# Patient Record
Sex: MALE | Race: OTHER
[De-identification: names, ages, dates, MRNs, and addresses within clinical notes are randomized per-mention and may not be internally consistent; named-entity substitution may affect disease eponyms.]

---

## 2020-06-15 ENCOUNTER — HOSPITAL ENCOUNTER (EMERGENCY)
Dept: HOSPITAL 72 - EMR | Age: 55
Discharge: HOME | End: 2020-06-15
Payer: MEDICAID

## 2020-06-15 VITALS — WEIGHT: 250 LBS | BODY MASS INDEX: 32.08 KG/M2 | HEIGHT: 74 IN

## 2020-06-15 VITALS — DIASTOLIC BLOOD PRESSURE: 92 MMHG | SYSTOLIC BLOOD PRESSURE: 140 MMHG

## 2020-06-15 DIAGNOSIS — R05: ICD-10-CM

## 2020-06-15 DIAGNOSIS — I10: ICD-10-CM

## 2020-06-15 DIAGNOSIS — K57.90: ICD-10-CM

## 2020-06-15 DIAGNOSIS — M25.552: Primary | ICD-10-CM

## 2020-06-15 DIAGNOSIS — M16.12: ICD-10-CM

## 2020-06-15 PROCEDURE — 99284 EMERGENCY DEPT VISIT MOD MDM: CPT

## 2020-06-15 PROCEDURE — 72192 CT PELVIS W/O DYE: CPT

## 2020-06-15 NOTE — DIAGNOSTIC IMAGING REPORT
EXAM:

  CT Pelvis Without Intravenous Contrast

 

CLINICAL HISTORY:

  TRAUMA

 

TECHNIQUE:

  Axial computed tomography images of the pelvis without intravenous 

contrast.  CTDI is 8 mGy and DLP is 271 mGy-cm.  One or more of the 

following dose reduction techniques were used: automated exposure control,

 adjustment of the mA and/or kV according to patient size, use of 

iterative reconstruction technique.

 

COMPARISON:

  No relevant prior studies available.

 

FINDINGS:

 

  Bones/joints:  No acute fracture.  No dislocation.  Mild to moderate 

bilateral hip osteoarthrosis.

  Soft tissues: No hematoma.  Colonic diverticulosis.  There is increased 

fluid within the left hip joint.

 

IMPRESSION:     

  No acute fracture.  Increased fluid in the left hip joint, correlate 

with synovitis/bursitis.  Consider outpatient MRI of the pelvis.

## 2020-06-15 NOTE — EMERGENCY ROOM REPORT
History of Present Illness


General


Chief Complaint:  Lower Extremity Injury


Source:  Patient





Present Illness


HPI


Patient presents with complaints of left inguinal left hip pain


He did appear somewhat inebriated with sluggish and slurring speech, and he 

does admit to drinking earlier today he does consider himself an alcoholic





Denies any fall however he reports that he was stretching and doing exercises 

when the pain came on


Denies any abdominal pain denies any testicular pain denies any rectal pain


Pain is worse with standing and walking


Allergies:  


Coded Allergies:  


     No Known Allergies (Unverified , 3/18/18)





COVID-19 Screening


Contact w/high risk pt:  No


Recent Travel to affected area:  No


Experienced COVID-19 symptoms?:  Yes


COVID-19 symptoms experienced:  Cough


COVID-19 Testing performed PTA:  No





Patient History


Past Medical History:  see triage record


Reviewed Nursing Documentation:  PMH: Agreed; PSxH: Agreed





Nursing Documentation-PMH


Hx Hypertension:  Yes





Review of Systems


All Other Systems:  negative except mentioned in HPI





Physical Exam





Vital Signs








  Date Time  Temp Pulse Resp B/P (MAP) Pulse Ox O2 Delivery O2 Flow Rate FiO2


 


6/15/20 18:58 97.9 110 22 146/100 (115) 96 Room Air  








Sp02 EP Interpretation:  reviewed, normal


General Appearance:  well appearing, no apparent distress


Head:  normocephalic, atraumatic


Eyes:  bilateral eye PERRL, bilateral eye EOMI


ENT:  hearing grossly normal, normal pharynx, TMs + canals normal, uvula midline


Neck:  full range of motion, supple, no meningismus, no bony tend


Respiratory:  lungs clear, normal breath sounds, no rhonchi, no respiratory 

distress, no retraction, no accessory muscle use


Cardiovascular #1:  normal peripheral pulses, regular rate, rhythm, no edema, 

no gallop, no JVD, no murmur


Gastrointestinal:  normal bowel sounds, non tender, soft, no mass, no 

organomegaly, non-distended, no guarding, no hernia, no pulsatile mass, no 

rebound


Genitourinary:  no CVA tenderness


Musculoskeletal:  other - Some reproducible discomfort at the left inguinal 

region patient is able to flex both hips


Neurologic:  oriented x3, sensory intact, responsive


Psychiatric:  mood/affect normal


Lymphatic:  normal inspection, no adenopathy





Medical Decision Making


Diagnostic Impression:  


 Primary Impression:  


 Groin strain


ER Course


Multiple differentials including but not limited to fracture, soft tissue injury

, septic joint considered





Patient is otherwise ambulatory and moving the hip well


CT imaging shows nonspecific nonacute, findings patient will benefit from 

outpatient imaging such as MRI


At this time consideration for septic joint is low


Given the patient's mobility and stable for close outpatient follow-up


CT/MRI/US Diagnostic Results


CT/MRI/US Diagnostic Results :  


   Impression


Pelvic CTIMPRESSION:     


  No acute fracture.  Increased fluid in the left hip joint, correlate 


with synovitis/bursitis.  Consider outpatient MRI of the pelvis.





Last Vital Signs








  Date Time  Temp Pulse Resp B/P (MAP) Pulse Ox O2 Delivery O2 Flow Rate FiO2


 


6/15/20 18:58 97.9 110 22 146/100 (115) 96 Room Air  








Status:  improved


Disposition:  HOME, SELF-CARE


Condition:  Improved


Scripts


Ibuprofen* (MOTRIN*) 600 Mg Tablet


600 MG ORAL Q6H PRN for FOR PAIN, #20 TAB 0 Refills


   Prov: Tee Campos DO         6/15/20





Additional Instructions:  


Patient is provided with the discharge instructions notified to follow up with 

primary doctor in the next 2-3 days otherwise return to the er with any 

worsening symptoms.


Please note that this report is being documented using DRAGON technology.  This 

can lead to erroneous entry secondary to incorrect interpretation by the 

dictating instrument.











Tee Campos DO Irvin 15, 2020 19:22

## 2020-06-15 NOTE — NUR
ED Nurse Note:



Pt brought into ED by BATSHEVA CHRISTENS[facility] for c/o bilat leg pain that radiates to L hip. 
Pt is breathing normal and unlabored. Pt is aaox4. NAD.

## 2020-07-13 ENCOUNTER — HOSPITAL ENCOUNTER (EMERGENCY)
Dept: HOSPITAL 72 - EMR | Age: 55
Discharge: HOME | End: 2020-07-13
Payer: MEDICAID

## 2020-07-13 VITALS — DIASTOLIC BLOOD PRESSURE: 104 MMHG | SYSTOLIC BLOOD PRESSURE: 156 MMHG

## 2020-07-13 VITALS — BODY MASS INDEX: 29.8 KG/M2 | WEIGHT: 220 LBS | HEIGHT: 72 IN

## 2020-07-13 VITALS — SYSTOLIC BLOOD PRESSURE: 156 MMHG | DIASTOLIC BLOOD PRESSURE: 104 MMHG

## 2020-07-13 DIAGNOSIS — F10.129: Primary | ICD-10-CM

## 2020-07-13 DIAGNOSIS — I10: ICD-10-CM

## 2020-07-13 LAB
ADD MANUAL DIFF: NO
ALBUMIN SERPL-MCNC: 4.2 G/DL (ref 3.4–5)
ALBUMIN/GLOB SERPL: 1 {RATIO} (ref 1–2.7)
ALP SERPL-CCNC: 92 U/L (ref 46–116)
ALT SERPL-CCNC: 61 U/L (ref 12–78)
ANION GAP SERPL CALC-SCNC: 19 MMOL/L (ref 5–15)
AST SERPL-CCNC: 165 U/L (ref 15–37)
BASOPHILS NFR BLD AUTO: 0.7 % (ref 0–2)
BILIRUB DIRECT SERPL-MCNC: 0.1 MG/DL (ref 0–0.3)
BILIRUB SERPL-MCNC: 1.1 MG/DL (ref 0.2–1)
BUN SERPL-MCNC: 11 MG/DL (ref 7–18)
CALCIUM SERPL-MCNC: 9 MG/DL (ref 8.5–10.1)
CHLORIDE SERPL-SCNC: 98 MMOL/L (ref 98–107)
CO2 SERPL-SCNC: 24 MMOL/L (ref 21–32)
CREAT SERPL-MCNC: 1.2 MG/DL (ref 0.55–1.3)
EOSINOPHIL NFR BLD AUTO: 1.4 % (ref 0–3)
ERYTHROCYTE [DISTWIDTH] IN BLOOD BY AUTOMATED COUNT: 14.8 % (ref 11.6–14.8)
GLOBULIN SER-MCNC: 4.1 G/DL
HCT VFR BLD CALC: 42.4 % (ref 42–52)
HGB BLD-MCNC: 14.6 G/DL (ref 14.2–18)
LYMPHOCYTES NFR BLD AUTO: 38 % (ref 20–45)
MCV RBC AUTO: 107 FL (ref 80–99)
MONOCYTES NFR BLD AUTO: 12.3 % (ref 1–10)
NEUTROPHILS NFR BLD AUTO: 47.7 % (ref 45–75)
PLATELET # BLD: 66 K/UL (ref 150–450)
POTASSIUM SERPL-SCNC: 3.5 MMOL/L (ref 3.5–5.1)
RBC # BLD AUTO: 3.98 M/UL (ref 4.7–6.1)
SODIUM SERPL-SCNC: 140 MMOL/L (ref 136–145)
WBC # BLD AUTO: 4.8 K/UL (ref 4.8–10.8)

## 2020-07-13 PROCEDURE — 96360 HYDRATION IV INFUSION INIT: CPT

## 2020-07-13 PROCEDURE — 85025 COMPLETE CBC W/AUTO DIFF WBC: CPT

## 2020-07-13 PROCEDURE — 71045 X-RAY EXAM CHEST 1 VIEW: CPT

## 2020-07-13 PROCEDURE — 84484 ASSAY OF TROPONIN QUANT: CPT

## 2020-07-13 PROCEDURE — 80053 COMPREHEN METABOLIC PANEL: CPT

## 2020-07-13 PROCEDURE — 93005 ELECTROCARDIOGRAM TRACING: CPT

## 2020-07-13 PROCEDURE — 82248 BILIRUBIN DIRECT: CPT

## 2020-07-13 PROCEDURE — 99284 EMERGENCY DEPT VISIT MOD MDM: CPT

## 2020-07-13 NOTE — EMERGENCY ROOM REPORT
History of Present Illness


General


Chief Complaint:  Pain


Source:  Patient





Present Illness


HPI


55-year-old male presents to ED for evaluation.  Brought in by EMS for 

generalized pain.  From Street.  Symptoms started today.  Patient was admitted 

drinking alcohol.  Also complained of some chest pain previously.  Denies chest 

pain at this time.  Denies shortness of breath.  Denies abdominal pain.  Denies 

nausea or vomiting.  Denies drug use.  No other aggravating relieving factors.  

Denies any other associated symptoms


Allergies:  


Coded Allergies:  


     No Known Allergies (Unverified , 3/18/18)





COVID-19 Screening


Contact w/high risk pt:  No


Recent Travel to affected area:  No


Experienced COVID-19 symptoms?:  No


COVID-19 symptoms experienced:  Cough


COVID-19 Testing performed PTA:  No





Patient History


Past Medical History:  HTN


Past Surgical History:  none


Pertinent Family History:  none


Social History:  Denies: smoking, alcohol use, drug use


Immunizations:  UTD


Reviewed Nursing Documentation:  PMH: Agreed; PSxH: Agreed





Nursing Documentation-PMH


Hx Hypertension:  Yes





Review of Systems


All Other Systems:  negative except mentioned in HPI





Physical Exam





Vital Signs








  Date Time  Temp Pulse Resp B/P (MAP) Pulse Ox O2 Delivery O2 Flow Rate FiO2


 


7/13/20 19:03 98.6 120 19 156/104 (121) 98 Room Air  








Sp02 EP Interpretation:  reviewed, normal


General Appearance:  no apparent distress, alert, GCS 15, non-toxic


Head:  normocephalic, atraumatic


Eyes:  bilateral eye normal inspection, bilateral eye PERRL


ENT:  hearing grossly normal, normal pharynx, no angioedema, normal voice


Neck:  full range of motion, supple/symm/no masses


Respiratory:  chest non-tender, lungs clear, normal breath sounds, speaking 

full sentences


Cardiovascular #1:  regular rate, rhythm, no edema


Cardiovascular #2:  2+ carotid (R), 2+ carotid (L), 2+ radial (R), 2+ radial (L)

, 2+ dorsalis pedis (R), 2+ dorsalis pedis (L)


Gastrointestinal:  normal bowel sounds, non tender, soft, non-distended, no 

guarding, no rebound


Rectal:  deferred


Genitourinary:  normal inspection, no CVA tenderness


Musculoskeletal:  back normal, normal range of motion, gait/station normal, non-

tender


Neurologic:  alert, motor strength/tone normal, oriented x3, sensory intact, 

responsive, speech normal


Psychiatric:  judgement/insight normal, memory normal, mood/affect normal, no 

suicidal/homicidal ideation


Reflexes:  3+ bicep (R), 3+ bicep (L), 3+ tricep (R), 3+ tricep (L), 3+ knee (R)

, 3+ knee (L)


Lymphatic:  no adenopathy





Medical Decision Making


Diagnostic Impression:  


 Primary Impression:  


 Acute alcoholic intoxication


 Qualified Codes:  F10.929 - Alcohol use, unspecified with intoxication, 

unspecified


ER Course


Hospital Course 


55-year-old M presents ED complaining of generalized pain, ? cheast pain, h/o 

ETOH





Differential diagnoses include: Rib fracture, MI/unstable angina, contusion, 

muscle strain





Clinical course


Patient placed on stretcher.  After initial history and physical I ordered labs

, EKG, IVFs





labs reviewed- all electrolytes normal, troponins negative, no leukocytosis, 

hemoglobin/hematocrit stable, ETOH elevated


EKG - NSR no acute ischemic changes interpreted by me 





On reassessment patient clinically sober.  Walking with steady gait.  I 

discussed findings with patient.  Safe for discharge with close outpatient 

follow-up.  I will provide referrals





I.  I feel this is a highly complex case requiring extensive working including 

EKG/Rhythm strip, Xray/CT/US, Blood/urine lab work, repeat exams while in ED, 

and administration of strong opiates/narcotics for pain control, admission to 

hospital or close patient follow up.  





Diagnosis -acute alcoholic intoxication





Stable and discharged to home.  Instructed to followup with PMD.  Return to ED 

if symptoms recur or worsen





Labs








Test


  7/13/20


19:39


 


White Blood Count


  4.8 K/UL


(4.8-10.8)


 


Red Blood Count


  3.98 M/UL


(4.70-6.10)


 


Hemoglobin


  14.6 G/DL


(14.2-18.0)


 


Hematocrit


  42.4 %


(42.0-52.0)


 


Mean Corpuscular Volume 107 FL (80-99) 


 


Mean Corpuscular Hemoglobin


  36.7 PG


(27.0-31.0)


 


Mean Corpuscular Hemoglobin


Concent 34.5 G/DL


(32.0-36.0)


 


Red Cell Distribution Width


  14.8 %


(11.6-14.8)


 


Platelet Count


  66 K/UL


(150-450)


 


Mean Platelet Volume


  11.4 FL


(6.5-10.1)


 


Neutrophils (%) (Auto)


  47.7 %


(45.0-75.0)


 


Lymphocytes (%) (Auto)


  38.0 %


(20.0-45.0)


 


Monocytes (%) (Auto)


  12.3 %


(1.0-10.0)


 


Eosinophils (%) (Auto)


  1.4 %


(0.0-3.0)


 


Basophils (%) (Auto)


  0.7 %


(0.0-2.0)


 


Sodium Level


  140 MMOL/L


(136-145)


 


Potassium Level


  3.5 MMOL/L


(3.5-5.1)


 


Chloride Level


  98 MMOL/L


()


 


Carbon Dioxide Level


  24 MMOL/L


(21-32)


 


Anion Gap


  19 mmol/L


(5-15)


 


Blood Urea Nitrogen


  11 mg/dL


(7-18)


 


Creatinine


  1.2 MG/DL


(0.55-1.30)


 


Estimat Glomerular Filtration


Rate > 60 mL/min


(>60)


 


Glucose Level


  117 MG/DL


()


 


Calcium Level


  9.0 MG/DL


(8.5-10.1)


 


Total Bilirubin


  1.1 MG/DL


(0.2-1.0)


 


Direct Bilirubin


  0.1 MG/DL


(0.0-0.3)


 


Aspartate Amino Transf


(AST/SGOT) 165 U/L


(15-37)


 


Alanine Aminotransferase


(ALT/SGPT) 61 U/L (12-78) 


 


 


Alkaline Phosphatase


  92 U/L


()


 


Troponin I


  0.000 ng/mL


(0.000-0.056)


 


Total Protein


  8.3 G/DL


(6.4-8.2)


 


Albumin


  4.2 G/DL


(3.4-5.0)


 


Globulin 4.1 g/dL 


 


Albumin/Globulin Ratio 1.0 (1.0-2.7) 


 


Salicylates Level


  1.8 ug/mL


(2.8-20)


 


Acetaminophen Level


  < 2 MCG/ML


(10-30)


 


Serum Alcohol 399 mg/dL 








EKG Diagnostic Results


Rate:  normal


Rhythm:  NSR


ST Segments:  no acute changes


ASA given to the pt in ED:  No





Rhythm Strip Diag. Results


EP Interpretation:  yes


Rhythm:  NSR, no PVC's, no ectopy





Last Vital Signs








  Date Time  Temp Pulse Resp B/P (MAP) Pulse Ox O2 Delivery O2 Flow Rate FiO2


 


7/13/20 20:43 98.6 87 19 156/104 98 Room Air  








Status:  improved


Disposition:  HOME, SELF-CARE


Condition:  Stable


Referrals:  


[facility]ERR[facility] (PCP)











[facility]


Patient Instructions:  Alcohol Intoxication, Easy-to-Read











Isaak Heaton MD Jul 13, 2020 22:04